# Patient Record
Sex: FEMALE | Race: OTHER
[De-identification: names, ages, dates, MRNs, and addresses within clinical notes are randomized per-mention and may not be internally consistent; named-entity substitution may affect disease eponyms.]

---

## 2018-03-03 ENCOUNTER — HOSPITAL ENCOUNTER (EMERGENCY)
Dept: HOSPITAL 53 - M ED | Age: 38
Discharge: HOME | End: 2018-03-03
Payer: COMMERCIAL

## 2018-03-03 DIAGNOSIS — Z79.899: ICD-10-CM

## 2018-03-03 DIAGNOSIS — I10: ICD-10-CM

## 2018-03-03 DIAGNOSIS — K08.89: Primary | ICD-10-CM

## 2018-03-03 PROCEDURE — 64400 NJX AA&/STRD TRIGEMINAL NRV: CPT

## 2018-03-03 RX ADMIN — BUPIVACAINE HYDROCHLORIDE 1 ML: 5 INJECTION, SOLUTION EPIDURAL; INTRACAUDAL at 12:30

## 2018-03-03 RX ADMIN — LIDOCAINE HYDROCHLORIDE 1 ML: 20 INJECTION, SOLUTION INFILTRATION; PERINEURAL at 12:30

## 2018-10-22 ENCOUNTER — HOSPITAL ENCOUNTER (OUTPATIENT)
Dept: HOSPITAL 53 - M LAB REF | Age: 38
End: 2018-10-22
Attending: PHYSICIAN ASSISTANT
Payer: COMMERCIAL

## 2018-10-22 DIAGNOSIS — J11.1: Primary | ICD-10-CM

## 2018-10-22 LAB
FLUAV RNA UPPER RESP QL NAA+PROBE: NEGATIVE
INFLUENZA B AMPLIFICATION: NEGATIVE

## 2018-10-22 PROCEDURE — 87502 INFLUENZA DNA AMP PROBE: CPT

## 2021-03-29 ENCOUNTER — HOSPITAL ENCOUNTER (OUTPATIENT)
Dept: HOSPITAL 53 - M WUC | Age: 41
End: 2021-03-29
Attending: PHYSICIAN ASSISTANT
Payer: COMMERCIAL

## 2021-03-29 DIAGNOSIS — M54.12: Primary | ICD-10-CM

## 2021-03-30 NOTE — REP
INDICATION:

RADICULOPATHY.



COMPARISON:

None.



TECHNIQUE:

AP, Lateral, open mouth views of the cervical spine.



FINDINGS:

Lateral view demonstrates straightening of normal lordosis which is nonspecific.

There is early moderate degenerative change and C5-6 and C6-7 including endplate

sclerosis with mild disc space narrowing and anterior spurring.



IMPRESSION:

Mild focal degenerative changes.





<Electronically signed by Jenaro Stringer > 03/30/21 0422